# Patient Record
Sex: MALE | Race: WHITE
[De-identification: names, ages, dates, MRNs, and addresses within clinical notes are randomized per-mention and may not be internally consistent; named-entity substitution may affect disease eponyms.]

---

## 2021-06-13 NOTE — NUR
a+o, not appropriatly placed in back room but very nice to work with, bp very
high but pt states it is where he expects it to be, dr aware and working with
pt to develop a plan, call light in reach, no acute changes noted during shift

## 2021-06-14 NOTE — NUR
DISCHARGE SUMMARY
RANDY LEFT WITH HIS GIRLFRIEND.  THOROUGH EDUCATION WAS DONE ON STROKE
PREVENTION AND S/S.  PIV REMOVED, RX FAXED TO [facility].  INFO GIVEN ON [facility] PCP OFFICE FOR HIM TO ESTABLISH CARE.  PT DECLINED WALKER/CANE
PRESCRIPTION.  WALKING IN ROOM WITH SBA, DECLINES WALKER OR CANE HERE ALSO.  R
SIDE HAS SOME RESIDUAL WEAKNESS BUT SENSATION IS INTACT.  DENIED PAIN.  PT
WHEELED OUT BY  BY STAFF

## 2021-06-14 NOTE — NUR
SHIFT SUMMARY
PATIENT HAD NO ACUTE CHANGES OBSERVED. ONE ASSIST W/FWW TO BATHROOM WITH RIGHT
SIDE WEAKNESS.  STRONG AND EQUAL. PEDAL PUSH STRONG ON LEFT LEG AND
WEAKER ON RIGHT LEG SIDE. PUPILS EQUAL AND REACTIVE. BP ELEVATED 175/106 AND
IV APRESOLINE 10 MG GIVEN PER EMAR FOR SBP >170. BP RECHECK: 149/84. DENIES
CHEST PAIN, SOB, AND N/V. PIV REMAINS INTACT. WATCHED TV FIRST PART OF SHIFT.
COOPERATIVE WITH CARE. CALL LIGHT IN REACH. BED IN LOWEST POSITION. WILL
CONTINUE TO MONITOR UNTIL DAY SHIFT NURSE ASSUMES CARE.

## 2022-04-27 NOTE — NUR
SUMMARY. PT ARRIVED FROM CATH LAB, ALERT AND ORIENTED, ON ROOM AIR. PT C/O
MILD NAUSEA, ORDER OBTAINED FROM DR. ACKERMAN FOR ZOFRAN PRN, NONE GIVEN. VS
STABLE. LUNGS CLEAR IN ALL FIELDS, BREATHING EVEN AND UNLABORED. CV
SINUS/SINUS LIZZY, PULSES PRESENT AND EQUAL, PT DENIES ANY CHEST PAIN. ABDOMEN
SOFT/NONTENDER. PT HAS R/ANGIO-SEAL AT GROIN ACCESS SITE FROM CATH LAB, NO
BLEEDING/SWELLING NOTED. NO ACUTE NEEDS NOTED. DR. DURAN STATED NO NITRO
FOR 72 HOURS DUE TO PT TAKING CIALIS LAST NIGHT, NURSE NOTIFY PUT IN CHART.
STAT EKG DONE POST PROCEDURE. NO ACUTE NEEDS, REPORT GIVEN TO DAYSHIFT RN.

## 2022-04-27 NOTE — NUR
REASSESSMENT
 
PT BP'S HAS BEEN TRENDING UPWARDS. DISCUSSED CARE WITH DR. HOME MEDS RESTARTED
AND PRN MEDICATION AVAILABLE NOW. PT CONTINUES TO DENY ANY CHEST PAIN. ABLE TO
REPOSITION SELF. SINUS LIZZY ON THE MONITOR. OTHER VITALS REMAIN STABLE. PT
WAS STATUS CHANGED TO PCU. CALL LIGHT REMAINS IN REACH OF PT.

## 2022-04-27 NOTE — NUR
ASSUMED CARE OF PT
 
PT RESTING ON BED, S/P ANGIO W/ STENT PLACEMENT W/ R. GROIN ACCESS ANGIOSEAL.
GROIN IS SOFT W/ SMALL LUMP AT INSERTION SITE. STABLE ON HANDOFF. PT DENIES
ANY CHEST PAIN. SINUS LIZZY ON THE MONITOR. VITALS STABLE. CARE PLAN DISCUSSED
WITH PT. ALL QUESTIONS ANSWERED. CALL LIGHT IN REACH.

## 2022-04-27 NOTE — NUR
SHIFT SUMMARY
PT ALERT AND ORIENTED x4, CALM AND COOPERATIVE. PT HAS DENIED CHEST PAIN ALL
DAY. RIGHT GROIN SITE STABLE WITH NO CHANGES. ANGIOSEAL CLOSURE. PT AMBULATED
IN ROOM WITH NO DIFFICULTIES TODAY. BP STARTED TO TREND UPWARDS, BUT DID
IMPROVE. HOME MEDS WERE STARTED BY . MONITOR HAS SHOWN SINUS LIZZY, OTHER
VITALS HAVE REMAINED STABLE. PT WAS MADE PCU STATUS THIS AFTERNOON.

## 2022-04-27 NOTE — NUR
REASSESSMENT
 
PT STOOD AT BEDSIDE WITH NO ASSISTANCE. DENIES ANY CHEST PAIN. RIGHT GROIN
ACCESS REMAINS STABLE. NO ACUTE CHANGES FROM PREVIOUS ASSESSMENT. SINUS LIZZY
ON MONITOR WHEN PT IS SLEEPING, SINUS RHYTHM WHEN AWAKE. VITALS REMAIN
STABLE.

## 2022-04-28 NOTE — NUR
NO ACUTE EVENTS OVERNIGHT.
PT DENIED CHEST PAIN OR PRESSURE THROUGHOUT THE SHIFT.
RIGHT FEMORAL ACCESS SITE SOFT, NO HEMATOMA, STRONG PULSE, DISTAL PULSES AND
SENSATION INTACT. PT COMPLAINS OF SLIGHT SORENESS AT SITE WITH COUGH.
 
EDUCATON PROVIDED REGARDINGM THE FOLLOWIN) LIMB & ACTIVITY RESTICTIONS POST HEART
CATHETERIZATION.
2) NEW MEDICATIONS AND IMPORTANCE OF COMPLIANCE WITH SAID MEDICATIONS.
3) CARDIAC RISK FACTORS
4) RISKS ASSOCIATED WITH HYPERTENSION
 
PT UP INDEPENDENTLY IN THE ROOM. RESPOSITIONS SELF IN BED. VOIDING URINE.
BOWEL MOVEMENTS x 2 OVERNIGHT.

## 2022-04-28 NOTE — NUR
BEGINNING OF SHIFT
 
Assumed care of pt at 0700. Report received from Yolanda SMART. Pt A&O x 4.
Answers questions. Follows commands. Verbalizes needs. Pleasant and
cooperative with care. SpO2 90% or greater room air. SB per monitor, rate 51.
BP stable. Right femoral artery access site covered with tegaderm. Per report
pt has angioseal. Color, sensation, pulses, capillary refill equal BLE.

## 2022-04-28 NOTE — NUR
Pt discharged from unit at 1310. Medications faxed to [facility] pharmacy in
[facility] per pt request. Educated pt on medications and angiogram site care.
Pt has stent card. Educated on daily weights and heart healthy diet. Also
provided tobacco cessation information. Pt verbalizes understanding of
education provided. IV access removed. Pt escorted to patient entrance
accompanied by this RN.

## 2023-03-01 ENCOUNTER — HOSPITAL ENCOUNTER (OUTPATIENT)
Dept: HOSPITAL 95 - LAB SHORT | Age: 63
Discharge: HOME | End: 2023-03-01
Attending: STUDENT IN AN ORGANIZED HEALTH CARE EDUCATION/TRAINING PROGRAM
Payer: OTHER GOVERNMENT

## 2023-03-01 DIAGNOSIS — E55.9: ICD-10-CM

## 2023-03-01 DIAGNOSIS — I10: ICD-10-CM

## 2023-03-01 DIAGNOSIS — E78.2: Primary | ICD-10-CM

## 2023-03-01 LAB
ALBUMIN SERPL BCP-MCNC: 3.5 G/DL (ref 3.4–5)
ALBUMIN/GLOB SERPL: 1.1 {RATIO} (ref 0.8–1.8)
ALT SERPL W P-5'-P-CCNC: 25 U/L (ref 12–78)
ANION GAP SERPL CALCULATED.4IONS-SCNC: 2 MMOL/L (ref 6–16)
AST SERPL W P-5'-P-CCNC: 12 U/L (ref 12–37)
BASOPHILS # BLD AUTO: 0.1 K/MM3 (ref 0–0.23)
BASOPHILS NFR BLD AUTO: 2 % (ref 0–2)
BILIRUB SERPL-MCNC: 0.6 MG/DL (ref 0.1–1)
BUN SERPL-MCNC: 20 MG/DL (ref 8–24)
CALCIUM SERPL-MCNC: 9.3 MG/DL (ref 8.5–10.1)
CHLORIDE SERPL-SCNC: 109 MMOL/L (ref 98–108)
CHOLEST SERPL-MCNC: 109 MG/DL (ref 50–200)
CHOLEST/HDLC SERPL: 2.6 {RATIO}
CO2 SERPL-SCNC: 32 MMOL/L (ref 21–32)
CREAT SERPL-MCNC: 1.19 MG/DL (ref 0.6–1.2)
DEPRECATED RDW RBC AUTO: 44.9 FL (ref 35.1–46.3)
EOSINOPHIL # BLD AUTO: 0.21 K/MM3 (ref 0–0.68)
EOSINOPHIL NFR BLD AUTO: 3 % (ref 0–6)
ERYTHROCYTE [DISTWIDTH] IN BLOOD BY AUTOMATED COUNT: 13.2 % (ref 11.7–14.2)
GLOBULIN SER CALC-MCNC: 3.3 G/DL (ref 2.2–4)
GLUCOSE SERPL-MCNC: 116 MG/DL (ref 70–99)
HCT VFR BLD AUTO: 46.1 % (ref 37–53)
HDLC SERPL-MCNC: 42 MG/DL (ref 39–?)
HGB BLD-MCNC: 15.2 G/DL (ref 13.5–17.5)
IMM GRANULOCYTES # BLD AUTO: 0.01 K/MM3 (ref 0–0.1)
IMM GRANULOCYTES NFR BLD AUTO: 0 % (ref 0–1)
LDLC SERPL CALC-MCNC: 52 MG/DL (ref 0–110)
LDLC/HDLC SERPL: 1.2 {RATIO}
LYMPHOCYTES # BLD AUTO: 2.5 K/MM3 (ref 0.84–5.2)
LYMPHOCYTES NFR BLD AUTO: 37 % (ref 21–46)
MCHC RBC AUTO-ENTMCNC: 33 G/DL (ref 31.5–36.5)
MCV RBC AUTO: 91 FL (ref 80–100)
MONOCYTES # BLD AUTO: 0.61 K/MM3 (ref 0.16–1.47)
MONOCYTES NFR BLD AUTO: 9 % (ref 4–13)
NEUTROPHILS # BLD AUTO: 3.32 K/MM3 (ref 1.96–9.15)
NEUTROPHILS NFR BLD AUTO: 49 % (ref 41–73)
NRBC # BLD AUTO: 0 K/MM3 (ref 0–0.02)
NRBC BLD AUTO-RTO: 0 /100 WBC (ref 0–0.2)
PLATELET # BLD AUTO: 134 K/MM3 (ref 150–400)
POTASSIUM SERPL-SCNC: 3.9 MMOL/L (ref 3.5–5.5)
PROT SERPL-MCNC: 6.8 G/DL (ref 6.4–8.2)
SODIUM SERPL-SCNC: 143 MMOL/L (ref 136–145)
TRIGL SERPL-MCNC: 76 MG/DL (ref 30–160)
VLDLC SERPL CALC-MCNC: 15 MG/DL (ref 6–32)